# Patient Record
Sex: FEMALE | Race: WHITE | Employment: UNEMPLOYED | URBAN - METROPOLITAN AREA
[De-identification: names, ages, dates, MRNs, and addresses within clinical notes are randomized per-mention and may not be internally consistent; named-entity substitution may affect disease eponyms.]

---

## 2018-10-06 ENCOUNTER — OFFICE VISIT (OUTPATIENT)
Dept: URGENT CARE | Facility: RETAIL CLINIC | Age: 48
End: 2018-10-06
Payer: COMMERCIAL

## 2018-10-06 VITALS — SYSTOLIC BLOOD PRESSURE: 118 MMHG | TEMPERATURE: 97.6 F | DIASTOLIC BLOOD PRESSURE: 78 MMHG

## 2018-10-06 DIAGNOSIS — H65.92 MIDDLE EAR EFFUSION, LEFT: Primary | ICD-10-CM

## 2018-10-06 PROCEDURE — 99203 OFFICE O/P NEW LOW 30 MIN: CPT | Performed by: PHYSICIAN ASSISTANT

## 2018-10-06 RX ORDER — OLANZAPINE 10 MG/1
TABLET ORAL
COMMUNITY
Start: 2018-08-10

## 2018-10-06 RX ORDER — ESCITALOPRAM OXALATE 10 MG/1
TABLET ORAL
COMMUNITY
Start: 2018-08-10

## 2018-10-06 RX ORDER — FLUTICASONE PROPIONATE 50 MCG
2 SPRAY, SUSPENSION (ML) NASAL DAILY
Qty: 1 BOTTLE | Refills: 0 | Status: SHIPPED | OUTPATIENT
Start: 2018-10-06

## 2018-10-06 RX ORDER — MUPIROCIN 20 MG/G
OINTMENT TOPICAL
Refills: 0 | COMMUNITY
Start: 2018-08-21

## 2018-10-06 NOTE — MR AVS SNAPSHOT
"              After Visit Summary   10/6/2018    Haleigh Cross    MRN: [id number]           Patient Information     Date Of Birth          1970        Visit Information        Provider Department      10/6/2018 11:40 AM Lauren Strickland PA-C [facility] [place]        Today's Diagnoses     Middle ear effusion, left    -  1      Care Instructions    For middle ear fluid  Take daily antihistamine daily - claritin/loradatine - 1 tablet daily  If needed, can call [facility] pharmacy to fill steroid nasal spray - can Start steroid nasal spray daily 2 spray in each nostril once a day  Continue antihistamine and if started steroid nasal spray for few days AFTER ears symptoms are resolved  Apply warm compresses/packs for 15 minutes daily.  Steam treatments or humidifier.  Tylenol or ibuprofen as needed for pain or fever  Please follow up with primary care provider if not improving, worsening or new symptoms            Follow-ups after your visit        Who to contact     You can reach your care team any time of the day by calling [phone].  Notification of test results:  If you have an abnormal lab result, we will notify you by phone as soon as possible.         Additional Information About Your Visit        MyChart Information     [employer] lets you send messages to your doctor, view your test results, renew your prescriptions, schedule appointments and more. To sign up, go to [email or web address] . Click on \"Log in\" on the left side of the screen, which will take you to the Welcome page. Then click on \"Sign up Now\" on the right side of the page.     You will be asked to enter the access code listed below, as well as some personal information. Please follow the directions to create your username and password.     Your access code is: [email or web address]  Expires: 2019 12:36 PM     Your access code will  in 90 days. If you need help or a new code, please call your [facility] or [phone].   "      Care EveryWhere ID     This is your Care EveryWhere ID. This could be used by other organizations to access your [facility] medical records  [id number]        Your Vitals Were     Temperature                   97.6  F (36.4  C) (Temporal)            Blood Pressure from Last 3 Encounters:   10/06/18 118/78    Weight from Last 3 Encounters:   No data found for Wt              Today, you had the following     No orders found for display         Today's Medication Changes          These changes are accurate as of 10/6/18 12:36 PM.  If you have any questions, ask your nurse or doctor.               Start taking these medicines.        Dose/Directions    fluticasone 50 MCG/ACT spray   Commonly known as:  FLONASE   Used for:  Middle ear effusion, left        Dose:  2 spray   Spray 2 sprays into both nostrils daily Hold on file. Patient will call if needed   Quantity:  1 Bottle   Refills:  0            Where to get your medicines      These medications were sent to [facility]/pharmacy #[id number] - [place], [place] - [address] [facility] AT [facility]  [address] [facility], [facility] [address]     Phone:  [phone]     fluticasone 50 MCG/ACT spray                Primary Care Provider Fax #    Physician No Ref-Primary [phone]       No address on file        Equal Access to Services     WILBUR PUENTE : Hadii will corleyo Soray, waaxda luqadaha, qaybta kaalmada adeegyada, helen combs. So [facility] [phone].    ATENCIÓN: Si habla español, tiene a willis disposición servicios gratuitos de asistencia lingüística. Leeanna al [phone].    We comply with applicable federal civil rights laws and [place] laws. We do not discriminate on the basis of race, color, national origin, age, disability, sex, sexual orientation, or gender identity.            Thank you!     Thank you for choosing [facility]  for your care. Our goal is always to provide you with excellent care.  Hearing back from our patients is one way we can continue to improve our services. Please take a few minutes to complete the written survey that you may receive in the mail after your visit with us. Thank you!             Your Updated Medication List - Protect others around you: Learn how to safely use, store and throw away your medicines at www.disposemymeds.org.          This list is accurate as of 10/6/18 12:36 PM.  Always use your most recent med list.                   Brand Name Dispense Instructions for use Diagnosis    escitalopram 10 MG tablet    LEXAPRO          fluticasone 50 MCG/ACT spray    FLONASE    1 Bottle    Spray 2 sprays into both nostrils daily Hold on file. Patient will call if needed    Middle ear effusion, left       mupirocin 2 % ointment    BACTROBAN     APPLY TO AFFECTED AREA TWICE DAILY FOR 7 TO 10 DAYS        OLANZapine 10 MG tablet    zyPREXA          OMEGA 3 PO

## 2018-10-06 NOTE — PROGRESS NOTES
Chief Complaint   Patient presents with     Ear Problem     left ear draining since this morning, not draining as much now, no ear  pain, no fevers      SUBJECTIVE:  Haleigh Cross is a 48 year old female who presents with left ear fluid since this morning. No pain. States fluid has not drained out of ear, just feels like fluid in ear  Severity: mild   Timing:gradual onset  Additional symptoms include current post nasal drainage, had cold symptoms earlier, was treated for sinus infx in August  History of recurrent otitis: no    Past Medical History:   Diagnosis Date     Breast cancer (H) 2016    R, surgery and radiation     S/P appendectomy      Current Outpatient Prescriptions   Medication Sig Dispense Refill     escitalopram (LEXAPRO) 10 MG tablet        mupirocin (BACTROBAN) 2 % ointment APPLY TO AFFECTED AREA TWICE DAILY FOR 7 TO 10 DAYS  0     OLANZapine (ZYPREXA) 10 MG tablet        Omega-3 Fatty Acids (OMEGA 3 PO)           Allergies   Allergen Reactions     Penicillins       History   Smoking Status     Not on file   Smokeless Tobacco     Not on file       ROS:   CONSTITUTIONAL:NEGATIVE for fever, chills  ENT/MOUTH: POSITIVE for ear pressure/plugged, has post nasal drainage currently, cold sxs earlier and NEGATIVE for ear pain bilateral  RESP:NEGATIVE for significant cough or wheezing    OBJECTIVE:  /78 (BP Location: Left arm)  Temp 97.6  F (36.4  C) (Temporal)  The right TM is gray, neutral position    The right auditory canal is normal and without drainage, edema or erythema  The left TM is gray with air/fluid interface  The left auditory canal is normal and without drainage, edema or erythema  Oropharynx exam is normal: no lesions, erythema, adenopathy or exudate.  GENERAL: no acute distress  EYES: conjunctiva clear  NECK: supple, non-tender to palpation, no adenopathy noted  RESP: lungs clear to auscultation - no rales, rhonchi or wheezes  CV: regular rates and rhythm, normal S1 S2, no murmur  noted  SKIN: no suspicious lesions or rashes     ASSESSMENT:  (H65.92) Middle ear effusion, left  (primary encounter diagnosis)    PLAN:  Plan: fluticasone (FLONASE) 50 MCG/ACT spray  For middle ear fluid  Take daily antihistamine daily - claritin/loradatine - 1 tablet daily  If needed, can call [facility] pharmacy to fill steroid nasal spray - can Start steroid nasal spray daily 2 spray in each nostril once a day  Continue antihistamine and if started steroid nasal spray for few days AFTER ears symptoms are resolved  Apply warm compresses/packs for 15 minutes daily.  Steam treatments or humidifier.  Tylenol or ibuprofen as needed for pain or fever  Please follow up with primary care provider if not improving, worsening or new symptoms      Lauren Strickland PA-C  [facility]

## 2018-10-06 NOTE — PATIENT INSTRUCTIONS
For middle ear fluid  Take daily antihistamine daily - claritin/loradatine - 1 tablet daily  If needed, can call [facility] pharmacy to fill steroid nasal spray - can Start steroid nasal spray daily 2 spray in each nostril once a day  Continue antihistamine and if started steroid nasal spray for few days AFTER ears symptoms are resolved  Apply warm compresses/packs for 15 minutes daily.  Steam treatments or humidifier.  Tylenol or ibuprofen as needed for pain or fever  Please follow up with primary care provider if not improving, worsening or new symptoms